# Patient Record
Sex: MALE | Race: WHITE | NOT HISPANIC OR LATINO | Employment: FULL TIME | URBAN - METROPOLITAN AREA
[De-identification: names, ages, dates, MRNs, and addresses within clinical notes are randomized per-mention and may not be internally consistent; named-entity substitution may affect disease eponyms.]

---

## 2023-04-11 ENCOUNTER — TELEPHONE (OUTPATIENT)
Dept: PRIMARY CARE | Facility: CLINIC | Age: 63
End: 2023-04-11
Payer: COMMERCIAL

## 2023-04-11 DIAGNOSIS — I10 HYPERTENSION, UNSPECIFIED TYPE: ICD-10-CM

## 2023-04-11 RX ORDER — AMLODIPINE AND BENAZEPRIL HYDROCHLORIDE 10; 40 MG/1; MG/1
1 CAPSULE ORAL DAILY
COMMUNITY
Start: 2022-01-24 | End: 2023-04-11 | Stop reason: SDUPTHER

## 2023-04-11 RX ORDER — AMLODIPINE AND BENAZEPRIL HYDROCHLORIDE 10; 40 MG/1; MG/1
1 CAPSULE ORAL DAILY
Qty: 90 CAPSULE | Refills: 3 | Status: SHIPPED | OUTPATIENT
Start: 2023-04-11 | End: 2024-04-23 | Stop reason: SDUPTHER

## 2023-04-28 DIAGNOSIS — I10 HYPERTENSION, UNSPECIFIED TYPE: Primary | ICD-10-CM

## 2023-04-28 RX ORDER — CLONIDINE HYDROCHLORIDE 0.2 MG/1
1 TABLET ORAL 2 TIMES DAILY
COMMUNITY
Start: 2022-02-01 | End: 2023-04-28 | Stop reason: SDUPTHER

## 2023-04-28 RX ORDER — CLONIDINE HYDROCHLORIDE 0.2 MG/1
0.2 TABLET ORAL 2 TIMES DAILY
Qty: 180 TABLET | Refills: 3 | Status: SHIPPED | OUTPATIENT
Start: 2023-04-28 | End: 2024-03-01

## 2023-08-14 ENCOUNTER — OFFICE VISIT (OUTPATIENT)
Dept: PRIMARY CARE | Facility: CLINIC | Age: 63
End: 2023-08-14
Payer: COMMERCIAL

## 2023-08-14 VITALS
DIASTOLIC BLOOD PRESSURE: 82 MMHG | BODY MASS INDEX: 37.71 KG/M2 | SYSTOLIC BLOOD PRESSURE: 138 MMHG | WEIGHT: 315 LBS | OXYGEN SATURATION: 94 % | HEART RATE: 71 BPM

## 2023-08-14 DIAGNOSIS — I89.0 LYMPHEDEMA: ICD-10-CM

## 2023-08-14 DIAGNOSIS — Z00.00 PREVENTATIVE HEALTH CARE: Primary | ICD-10-CM

## 2023-08-14 DIAGNOSIS — Z12.5 SCREENING FOR MALIGNANT NEOPLASM OF PROSTATE: ICD-10-CM

## 2023-08-14 DIAGNOSIS — E53.8 LOW SERUM VITAMIN B12: ICD-10-CM

## 2023-08-14 DIAGNOSIS — E11.9 NON-INSULIN DEPENDENT TYPE 2 DIABETES MELLITUS (MULTI): ICD-10-CM

## 2023-08-14 DIAGNOSIS — R79.89 LOW VITAMIN D LEVEL: ICD-10-CM

## 2023-08-14 DIAGNOSIS — E78.2 COMBINED HYPERLIPIDEMIA: ICD-10-CM

## 2023-08-14 DIAGNOSIS — E04.1 THYROID NODULE: ICD-10-CM

## 2023-08-14 PROCEDURE — 3075F SYST BP GE 130 - 139MM HG: CPT | Performed by: FAMILY MEDICINE

## 2023-08-14 PROCEDURE — 1036F TOBACCO NON-USER: CPT | Performed by: FAMILY MEDICINE

## 2023-08-14 PROCEDURE — 99214 OFFICE O/P EST MOD 30 MIN: CPT | Performed by: FAMILY MEDICINE

## 2023-08-14 PROCEDURE — 3008F BODY MASS INDEX DOCD: CPT | Performed by: FAMILY MEDICINE

## 2023-08-14 PROCEDURE — 3079F DIAST BP 80-89 MM HG: CPT | Performed by: FAMILY MEDICINE

## 2023-08-14 RX ORDER — BISOPROLOL FUMARATE AND HYDROCHLOROTHIAZIDE 10; 6.25 MG/1; MG/1
1 TABLET ORAL DAILY
COMMUNITY
End: 2024-01-11

## 2023-08-14 RX ORDER — SPIRONOLACTONE 50 MG/1
1 TABLET, FILM COATED ORAL DAILY
COMMUNITY
Start: 2022-02-11 | End: 2024-02-19 | Stop reason: SDUPTHER

## 2023-08-14 RX ORDER — ATORVASTATIN CALCIUM 80 MG/1
80 TABLET, FILM COATED ORAL DAILY
Qty: 90 TABLET | Refills: 3 | Status: SHIPPED | OUTPATIENT
Start: 2023-08-14 | End: 2024-08-13

## 2023-08-14 RX ORDER — ATORVASTATIN CALCIUM 80 MG/1
1 TABLET, FILM COATED ORAL DAILY
COMMUNITY
Start: 2022-02-01 | End: 2023-08-14 | Stop reason: SDUPTHER

## 2023-08-14 RX ORDER — ASPIRIN 81 MG/1
1 TABLET ORAL DAILY
COMMUNITY

## 2023-08-14 RX ORDER — ORAL SEMAGLUTIDE 14 MG/1
TABLET ORAL
COMMUNITY
End: 2024-01-11

## 2023-08-14 RX ORDER — ERGOCALCIFEROL 1.25 MG/1
50000 CAPSULE ORAL
COMMUNITY
End: 2024-01-11

## 2023-08-14 ASSESSMENT — PATIENT HEALTH QUESTIONNAIRE - PHQ9
SUM OF ALL RESPONSES TO PHQ9 QUESTIONS 1 AND 2: 0
2. FEELING DOWN, DEPRESSED OR HOPELESS: NOT AT ALL
1. LITTLE INTEREST OR PLEASURE IN DOING THINGS: NOT AT ALL

## 2023-08-14 NOTE — PROGRESS NOTES
Subjective   Jus Martinez is a 63 y.o. male who presents for Follow-up (6 month follow up).    HPI  penicillin allergy   from [place]   reports he is feeling well      penicillin allergy   from [place]   reports he is feeling well      #) HTN   - was recently started on meds: amlodipine - benazepril 10-40, bisoprolol-HCTZ 10-6.25 , clonidine 0.2 BID, spironolactone 50 mg   - BP is 168/93 --> 134/80 --> 132/84  - saw Dr cardenas - follow up 2/11/22  - EKG 2/1/22 looked good   - ordered renal US and labs done  - BP is still high today   - runs in the family   - no headaches      #) T2DM   - Dx a couple of years ago   - on metformin 1000 mg twice per day , will add rybelsus   - no checking sugars much at home   - A1c was done on 11/9/2021 at 7.7 --> 7.2% on 8/8/22 --> 7.1% on 2/13/23   - on ACE and Statin   micro albumin 7/26/21  Lipid panel looked good on 11/9/21      ROS was completed and all systems are negative with the exception of what was noted in the the HPI.     Objective     /82   Pulse 71   Wt 144 kg (318 lb)   SpO2 94%   BMI 37.71 kg/m²      Physical Exam  GEN: A+O, no acute distress  HEENT: NC/AT, Oropharynx clear, no exudates, TM visualized, Extraoccular muscles intact, no facial droop; no thyromegaly or cervical LAD  RESP: CTAB, no wheezes   CV: RRR, no murmurs  ABD: soft, non-tender, + BS  SKIN: no rashes or bruising, no peripheral edema   NEURO: CN II-XII grossly intact, moves all extremities equally, no tremor   PSYCH: normal affect, appropriate mood     Assessment/Plan   Problem List Items Addressed This Visit       Low serum vitamin B12    Relevant Orders    Vitamin B12    Low vitamin D level    Relevant Orders    Vitamin D 25-Hydroxy,Total     Other Visit Diagnoses       Preventative health care    -  Primary    Relevant Orders    CBC and Auto Differential    Comprehensive metabolic panel    Hemoglobin A1C    Tsh With Reflex To Free T4 If Abnormal    Screening for malignant  neoplasm of prostate        Relevant Orders    Prostate Spec.Ag,Screen    Combined hyperlipidemia        Relevant Medications    atorvastatin (Lipitor) 80 mg tablet    Other Relevant Orders    Lipid panel    Thyroid nodule        Relevant Orders    US thyroid    Non-insulin dependent type 2 diabetes mellitus (CMS/HCC)               Labs do again in August 2023 , order given today      Continue the rybelsus 14 mg daily.      Please continue a weekly vitamin D supplement      Please continue an over the counter B12 supplement.     please check with your local pharmacist for the shingles vaccination     Please get a thyroid ultrasound repeated in September 2023, please get prior to next visit.      Continue to use the sleep medicine      Monitor you blood pressure at home , goal is to get this below 130/80.     Follow up in 6 months for your yearly physical or sooner as needed.            Brittanie Childress DO, [facility], [facility]  [address]   [address]   [place] [address]  Ph. [phone]  Fx. [phone]

## 2023-08-14 NOTE — PATIENT INSTRUCTIONS
Labs do again in August 2023 , order given today      Continue the rybelsus 14 mg daily.      Please continue a weekly vitamin D supplement      Please continue an over the counter B12 supplement.     please check with your local pharmacist for the shingles vaccination     Please get a thyroid ultrasound repeated in September 2023, please get prior to next visit.      Continue to use the sleep medicine      Monitor you blood pressure at home , goal is to get this below 130/80.     Follow up in 6 months for your yearly physical or sooner as needed.

## 2024-01-10 DIAGNOSIS — I10 ESSENTIAL (PRIMARY) HYPERTENSION: ICD-10-CM

## 2024-01-10 DIAGNOSIS — R79.89 OTHER SPECIFIED ABNORMAL FINDINGS OF BLOOD CHEMISTRY: ICD-10-CM

## 2024-01-10 DIAGNOSIS — E11.9 TYPE 2 DIABETES MELLITUS WITHOUT COMPLICATIONS (MULTI): ICD-10-CM

## 2024-01-11 RX ORDER — BISOPROLOL FUMARATE AND HYDROCHLOROTHIAZIDE 10; 6.25 MG/1; MG/1
1 TABLET ORAL DAILY
Qty: 90 TABLET | Refills: 3 | Status: SHIPPED | OUTPATIENT
Start: 2024-01-11

## 2024-01-11 RX ORDER — ERGOCALCIFEROL 1.25 MG/1
1 CAPSULE ORAL
Qty: 12 CAPSULE | Refills: 3 | Status: SHIPPED | OUTPATIENT
Start: 2024-01-11

## 2024-01-11 RX ORDER — ORAL SEMAGLUTIDE 14 MG/1
TABLET ORAL
Qty: 90 TABLET | Refills: 3 | Status: SHIPPED | OUTPATIENT
Start: 2024-01-11

## 2024-02-09 ENCOUNTER — LAB (OUTPATIENT)
Dept: LAB | Facility: LAB | Age: 64
End: 2024-02-09
Payer: COMMERCIAL

## 2024-02-09 DIAGNOSIS — E78.2 COMBINED HYPERLIPIDEMIA: ICD-10-CM

## 2024-02-09 DIAGNOSIS — R79.89 LOW VITAMIN D LEVEL: ICD-10-CM

## 2024-02-09 DIAGNOSIS — Z12.5 SCREENING FOR MALIGNANT NEOPLASM OF PROSTATE: ICD-10-CM

## 2024-02-09 DIAGNOSIS — E53.8 LOW SERUM VITAMIN B12: ICD-10-CM

## 2024-02-09 DIAGNOSIS — Z00.00 PREVENTATIVE HEALTH CARE: ICD-10-CM

## 2024-02-09 LAB
ALBUMIN SERPL BCP-MCNC: 3.9 G/DL (ref 3.4–5)
ALP SERPL-CCNC: 54 U/L (ref 33–136)
ALT SERPL W P-5'-P-CCNC: 15 U/L (ref 10–52)
ANION GAP SERPL CALC-SCNC: 11 MMOL/L (ref 10–20)
AST SERPL W P-5'-P-CCNC: 15 U/L (ref 9–39)
BASOPHILS # BLD AUTO: 0.07 X10*3/UL (ref 0–0.1)
BASOPHILS NFR BLD AUTO: 0.7 %
BILIRUB SERPL-MCNC: 0.7 MG/DL (ref 0–1.2)
BUN SERPL-MCNC: 22 MG/DL (ref 6–23)
CALCIUM SERPL-MCNC: 9.1 MG/DL (ref 8.6–10.3)
CHLORIDE SERPL-SCNC: 101 MMOL/L (ref 98–107)
CHOLEST SERPL-MCNC: 111 MG/DL (ref 0–199)
CHOLESTEROL/HDL RATIO: 3.7
CO2 SERPL-SCNC: 27 MMOL/L (ref 21–32)
CREAT SERPL-MCNC: 1.16 MG/DL (ref 0.5–1.3)
EGFRCR SERPLBLD CKD-EPI 2021: 71 ML/MIN/1.73M*2
EOSINOPHIL # BLD AUTO: 0.43 X10*3/UL (ref 0–0.7)
EOSINOPHIL NFR BLD AUTO: 4.5 %
ERYTHROCYTE [DISTWIDTH] IN BLOOD BY AUTOMATED COUNT: 12.8 % (ref 11.5–14.5)
GLUCOSE SERPL-MCNC: 122 MG/DL (ref 74–99)
HCT VFR BLD AUTO: 44.2 % (ref 41–52)
HDLC SERPL-MCNC: 29.7 MG/DL
HGB BLD-MCNC: 14.6 G/DL (ref 13.5–17.5)
IMM GRANULOCYTES # BLD AUTO: 0.02 X10*3/UL (ref 0–0.7)
IMM GRANULOCYTES NFR BLD AUTO: 0.2 % (ref 0–0.9)
LDLC SERPL CALC-MCNC: 62 MG/DL
LYMPHOCYTES # BLD AUTO: 1.83 X10*3/UL (ref 1.2–4.8)
LYMPHOCYTES NFR BLD AUTO: 19.1 %
MCH RBC QN AUTO: 30 PG (ref 26–34)
MCHC RBC AUTO-ENTMCNC: 33 G/DL (ref 32–36)
MCV RBC AUTO: 91 FL (ref 80–100)
MONOCYTES # BLD AUTO: 0.56 X10*3/UL (ref 0.1–1)
MONOCYTES NFR BLD AUTO: 5.8 %
NEUTROPHILS # BLD AUTO: 6.67 X10*3/UL (ref 1.2–7.7)
NEUTROPHILS NFR BLD AUTO: 69.7 %
NON HDL CHOLESTEROL: 81 MG/DL (ref 0–149)
NRBC BLD-RTO: 0 /100 WBCS (ref 0–0)
PLATELET # BLD AUTO: 125 X10*3/UL (ref 150–450)
POTASSIUM SERPL-SCNC: 4.2 MMOL/L (ref 3.5–5.3)
PROT SERPL-MCNC: 6.9 G/DL (ref 6.4–8.2)
RBC # BLD AUTO: 4.87 X10*6/UL (ref 4.5–5.9)
SODIUM SERPL-SCNC: 135 MMOL/L (ref 136–145)
TRIGL SERPL-MCNC: 97 MG/DL (ref 0–149)
TSH SERPL-ACNC: 2.51 MIU/L (ref 0.44–3.98)
VLDL: 19 MG/DL (ref 0–40)
WBC # BLD AUTO: 9.6 X10*3/UL (ref 4.4–11.3)

## 2024-02-09 PROCEDURE — 36415 COLL VENOUS BLD VENIPUNCTURE: CPT

## 2024-02-09 PROCEDURE — 82607 VITAMIN B-12: CPT

## 2024-02-09 PROCEDURE — 80053 COMPREHEN METABOLIC PANEL: CPT

## 2024-02-09 PROCEDURE — 80061 LIPID PANEL: CPT

## 2024-02-09 PROCEDURE — 84443 ASSAY THYROID STIM HORMONE: CPT

## 2024-02-09 PROCEDURE — 85025 COMPLETE CBC W/AUTO DIFF WBC: CPT

## 2024-02-09 PROCEDURE — 82306 VITAMIN D 25 HYDROXY: CPT

## 2024-02-09 PROCEDURE — 84153 ASSAY OF PSA TOTAL: CPT

## 2024-02-09 PROCEDURE — 83036 HEMOGLOBIN GLYCOSYLATED A1C: CPT

## 2024-02-10 LAB
25(OH)D3 SERPL-MCNC: 91 NG/ML (ref 30–100)
EST. AVERAGE GLUCOSE BLD GHB EST-MCNC: 174 MG/DL
HBA1C MFR BLD: 7.7 %
PSA SERPL-MCNC: 0.41 NG/ML
VIT B12 SERPL-MCNC: 375 PG/ML (ref 211–911)

## 2024-02-19 ENCOUNTER — OFFICE VISIT (OUTPATIENT)
Dept: PRIMARY CARE | Facility: CLINIC | Age: 64
End: 2024-02-19
Payer: COMMERCIAL

## 2024-02-19 VITALS
WEIGHT: 313 LBS | DIASTOLIC BLOOD PRESSURE: 78 MMHG | HEART RATE: 67 BPM | SYSTOLIC BLOOD PRESSURE: 124 MMHG | OXYGEN SATURATION: 97 % | BODY MASS INDEX: 37.12 KG/M2

## 2024-02-19 DIAGNOSIS — E11.9 NON-INSULIN DEPENDENT TYPE 2 DIABETES MELLITUS (MULTI): ICD-10-CM

## 2024-02-19 DIAGNOSIS — I10 HYPERTENSION, UNSPECIFIED TYPE: ICD-10-CM

## 2024-02-19 DIAGNOSIS — E04.1 THYROID NODULE: ICD-10-CM

## 2024-02-19 DIAGNOSIS — Z00.00 PREVENTATIVE HEALTH CARE: Primary | ICD-10-CM

## 2024-02-19 PROCEDURE — 1036F TOBACCO NON-USER: CPT | Performed by: FAMILY MEDICINE

## 2024-02-19 PROCEDURE — 3051F HG A1C>EQUAL 7.0%<8.0%: CPT | Performed by: FAMILY MEDICINE

## 2024-02-19 PROCEDURE — 99396 PREV VISIT EST AGE 40-64: CPT | Performed by: FAMILY MEDICINE

## 2024-02-19 PROCEDURE — 3048F LDL-C <100 MG/DL: CPT | Performed by: FAMILY MEDICINE

## 2024-02-19 PROCEDURE — 3078F DIAST BP <80 MM HG: CPT | Performed by: FAMILY MEDICINE

## 2024-02-19 PROCEDURE — 3008F BODY MASS INDEX DOCD: CPT | Performed by: FAMILY MEDICINE

## 2024-02-19 PROCEDURE — 99214 OFFICE O/P EST MOD 30 MIN: CPT | Performed by: FAMILY MEDICINE

## 2024-02-19 PROCEDURE — 3074F SYST BP LT 130 MM HG: CPT | Performed by: FAMILY MEDICINE

## 2024-02-19 RX ORDER — DAPAGLIFLOZIN 10 MG/1
10 TABLET, FILM COATED ORAL DAILY
Qty: 90 TABLET | Refills: 3 | Status: SHIPPED | OUTPATIENT
Start: 2024-02-19 | End: 2024-02-26 | Stop reason: CLARIF

## 2024-02-19 RX ORDER — SPIRONOLACTONE 50 MG/1
50 TABLET, FILM COATED ORAL DAILY
Qty: 90 TABLET | Refills: 3 | Status: SHIPPED | OUTPATIENT
Start: 2024-02-19 | End: 2025-02-18

## 2024-02-19 ASSESSMENT — PATIENT HEALTH QUESTIONNAIRE - PHQ9
1. LITTLE INTEREST OR PLEASURE IN DOING THINGS: NOT AT ALL
SUM OF ALL RESPONSES TO PHQ9 QUESTIONS 1 AND 2: 0
2. FEELING DOWN, DEPRESSED OR HOPELESS: NOT AT ALL

## 2024-02-19 NOTE — PROGRESS NOTES
Subjective   Jus Martinez is a 64 y.o. male who presents for Annual Exam (Physical).    HPI  penicillin allergy   from [place]   reports he is feeling well      penicillin allergy   from [place]   reports he is feeling well   - labs done on 2/9/24- We might need to add another pill, such a farxiga or Jardiance.  Other than elevated sugar the electrolytes look good, lvier and kidney function is good. Platelet are slightly low, which was also low last year. Normal red and white blood cell counts, no evidence of anemia or infection. Vitamin D is good at 91, you can reduce dose by half. Vitamin B 12 is good. Normal prostate level. Cholesterol looks ok on the atorvastatin 80 mg daily. Normal thyroid levels.      #) HTN   - was recently started on meds: amlodipine - benazepril 10-40, bisoprolol-HCTZ 10-6.25 , clonidine 0.2 BID, spironolactone 50 mg   - BP is 168/93 --> 134/80 --> 132/84--> 124/78   - saw Dr cardenas in the past, not currently   - EKG 2/1/22 looked good   - ordered renal US and labs done- normal   - runs in the family   - no headaches , chest pain , dyspnea, change in vision  - FIB-4- 1.92      #) T2DM   - Dx a couple of years ago   - on metformin 1000 mg twice per day , will add rybelsus   - no checking sugars much at home   - A1c was done on 11/9/2021 at 7.7 --> 7.2% on 8/8/22 --> 7.1% on 2/13/23 --> 7/7% on 2/9/24  - on ACE and Statin   micro albumin 7/26/21  Lipid panel looked good on 11/9/21     #) Thyroid nodule- new order for US follow up      ROS was completed and all systems are negative with the exception of what was noted in the the HPI.     Objective     /78   Pulse 67   Wt 142 kg (313 lb)   SpO2 97%   BMI 37.12 kg/m²      Physical Exam  GEN: A+O, no acute distress  HEENT: NC/AT, Oropharynx clear, no exudates, TM visualized, Extraoccular muscles intact, no facial droop; no thyromegaly or cervical LAD  RESP: CTAB, no wheezes   CV: RRR, no murmurs  ABD: soft, non-tender, +  BS  SKIN: no rashes or bruising, no peripheral edema   NEURO: CN II-XII grossly intact, moves all extremities equally, no tremor   PSYCH: normal affect, appropriate mood     Assessment/Plan   Problem List Items Addressed This Visit    None        We reviewed your labs on 2/9/24- We might need to add another pill, such a farxiga or Jardiance.  Other than elevated sugar the electrolytes look good, lvier and kidney function is good. Platelet are slightly low, which was also low last year. Normal red and white blood cell counts, no evidence of anemia or infection. Vitamin D is good at 91, you can reduce dose by half. Vitamin B 12 is good. Normal prostate level. Cholesterol looks ok on the atorvastatin 80 mg daily. Normal thyroid levels.      Continue the rybelsus 14 mg daily.  We might want to add farxiga for better sugar control.     You are down 5# since last visit.   BMI is 37, goal is to get this under 30.   please continue to work on health diet and movement.     Blood pressure looks good at 124/78.     Please continue a weekly vitamin D supplement      Please continue an over the counter B12 supplement.     please check with your local pharmacist for the shingles vaccination     Please get a thyroid ultrasound repeated in September 2023, please get prior to next visit. New order placed      Continue to use the sleep medicine      Monitor you blood pressure at home , goal is to get this below 130/80.     Follow up in 6 months for a  follow up or sooner as needed.         Brittanie Childress DO, [facility], [facility]  [address]   [address]   [place] [address]  Ph. [phone]  Fx. [phone]

## 2024-02-19 NOTE — PATIENT INSTRUCTIONS
We reviewed your labs on 2/9/24- We might need to add another pill, such a farxiga or Jardiance.  Other than elevated sugar the electrolytes look good, lvier and kidney function is good. Platelet are slightly low, which was also low last year. Normal red and white blood cell counts, no evidence of anemia or infection. Vitamin D is good at 91, you can reduce dose by half. Vitamin B 12 is good. Normal prostate level. Cholesterol looks ok on the atorvastatin 80 mg daily. Normal thyroid levels.      Continue the rybelsus 14 mg daily.  We might want to add farxiga for better sugar control.     You are down 5# since last visit.   BMI is 37, goal is to get this under 30.   please continue to work on health diet and movement.     Blood pressure looks good at 124/78.     Please continue a weekly vitamin D supplement      Please continue an over the counter B12 supplement.     please check with your local pharmacist for the shingles vaccination     Please get a thyroid ultrasound repeated in September 2023, please get prior to next visit. New order placed      Continue to use the sleep medicine      Monitor you blood pressure at home , goal is to get this below 130/80.     Follow up in 6 months for a  follow up or sooner as needed.

## 2024-02-26 DIAGNOSIS — E11.9 NON-INSULIN DEPENDENT TYPE 2 DIABETES MELLITUS (MULTI): Primary | ICD-10-CM

## 2024-02-29 DIAGNOSIS — I10 HYPERTENSION, UNSPECIFIED TYPE: ICD-10-CM

## 2024-03-01 RX ORDER — CLONIDINE HYDROCHLORIDE 0.2 MG/1
0.2 TABLET ORAL 2 TIMES DAILY
Qty: 180 TABLET | Refills: 3 | Status: SHIPPED | OUTPATIENT
Start: 2024-03-01

## 2024-04-23 DIAGNOSIS — I10 HYPERTENSION, UNSPECIFIED TYPE: ICD-10-CM

## 2024-04-23 RX ORDER — AMLODIPINE AND BENAZEPRIL HYDROCHLORIDE 10; 40 MG/1; MG/1
1 CAPSULE ORAL DAILY
Qty: 90 CAPSULE | Refills: 3 | Status: SHIPPED | OUTPATIENT
Start: 2024-04-23

## 2024-05-07 NOTE — PROGRESS NOTES
"[facility]  Followup Visit Note    HISTORY OF PRESENT ILLNESS   Jus Martinez \"Dio\" is a 64 y.o. male who presents to a [facility] for followup.       Current History    On today's visit, the patient reports he is doing well with CPAP. He feels he sleeps better and is less tired with CPAP.    Using FFM and finds it comfortable. No pressure or humidity problems.                        PREVIOUS VISIT 04/26/2023  OBSTRUCTIVE SLEEP APNEA  -Download shows 100% use > 4 hours per night with average use 7 hours 42 minutes per night. Pressure 5-15 cm H2O. 95% pressure 13.7 cm H2O. 95% Leak is 8 L/min. AHI 0.4.  -Ordering supplies from [facility]  -Tolerating FFM mask, pressure, humidity, machine well  -Reviewed risks of untreated sleep apnea including HTN and increased CVD risk  -Goal of CPAP is less daytime sleepiness and he feels CPAP is helping     OBESITY  -BMI 38 today  -Recommend healthy diet and exercise to help maintain healthy weight     HYPERTENSION  -/85 and 152/90 today     Followup in 1 year for ASHA/CPAP mgmt      Chief Complaint  Chief Complaints    · Visit For: Other     FUV ASHA on CPAP DME is [facility].     Sleep Scales:  ESS: 8     REVIEW OF SYSTEMS    All other systems negative      PHYSICAL EXAM     VITAL SIGNS: /81   Pulse 65   Ht 1.956 m (6' 5\")   Wt 134 kg (296 lb)   SpO2 95%   BMI 35.10 kg/m²      CURRENT WEIGHT: [unfilled]  BMI: [unfilled]  PREVIOUS WEIGHTS:  Wt Readings from Last 3 Encounters:   05/08/24 134 kg (296 lb)   02/19/24 142 kg (313 lb)   08/14/23 144 kg (318 lb)       Constitutional: Alert and oriented, cooperative, no obvious distress   HEENT: Non icteric or anemic, EOM WNL bilaterally   Neck: Supple, no JVD, no goiter, no adenopathy, no rigidity  Extremities: No clubbing, no LL edema   Neuromuscular: Cranial nerves grossly intact, no focal deficits     RESULTS/DATA     No results found for: \"IRON\", \"TRANSFERRIN\", " "\"IRONSAT\", \"TIBC\", \"FERRITIN\"      ASSESSMENT/PLAN     Mr. Martinez is a 64 y.o. male and returns in followup for the following issues:    OBSTRUCTIVE SLEEP APNEA  -Benefiting from CPAP  -Good compliance, ASHA well controlled per download   -Tolerating mask, pressure, humidity well    BMI>35  -Body mass index is 35.1 kg/m².  today  -With sufficient weight loss may no longer require treatment for ASHA    HYPERTENSION  BP Readings from Last 3 Encounters:   05/08/24 124/81   02/19/24 124/78   08/14/23 138/82      -Overall controlled with current medications     Follow up 1 yr         "

## 2024-05-08 ENCOUNTER — OFFICE VISIT (OUTPATIENT)
Dept: SLEEP MEDICINE | Facility: CLINIC | Age: 64
End: 2024-05-08
Payer: COMMERCIAL

## 2024-05-08 VITALS
DIASTOLIC BLOOD PRESSURE: 81 MMHG | HEART RATE: 65 BPM | HEIGHT: 77 IN | SYSTOLIC BLOOD PRESSURE: 124 MMHG | WEIGHT: 296 LBS | BODY MASS INDEX: 34.95 KG/M2 | OXYGEN SATURATION: 95 %

## 2024-05-08 DIAGNOSIS — G47.33 OSA (OBSTRUCTIVE SLEEP APNEA): Primary | ICD-10-CM

## 2024-05-08 DIAGNOSIS — I10 ESSENTIAL HYPERTENSION: ICD-10-CM

## 2024-05-08 PROCEDURE — 3074F SYST BP LT 130 MM HG: CPT | Performed by: PHYSICIAN ASSISTANT

## 2024-05-08 PROCEDURE — 3079F DIAST BP 80-89 MM HG: CPT | Performed by: PHYSICIAN ASSISTANT

## 2024-05-08 PROCEDURE — 1036F TOBACCO NON-USER: CPT | Performed by: PHYSICIAN ASSISTANT

## 2024-05-08 PROCEDURE — 99214 OFFICE O/P EST MOD 30 MIN: CPT | Performed by: PHYSICIAN ASSISTANT

## 2024-05-08 PROCEDURE — 3008F BODY MASS INDEX DOCD: CPT | Performed by: PHYSICIAN ASSISTANT

## 2024-05-08 ASSESSMENT — SLEEP AND FATIGUE QUESTIONNAIRES
ESS-CHAD TOTAL SCORE: 8
HOW LIKELY ARE YOU TO NOD OFF OR FALL ASLEEP WHEN YOU ARE A PASSENGER IN A CAR FOR AN HOUR WITHOUT A BREAK: WOULD NEVER DOZE
HOW LIKELY ARE YOU TO NOD OFF OR FALL ASLEEP WHILE WATCHING TV: MODERATE CHANCE OF DOZING
HOW LIKELY ARE YOU TO NOD OFF OR FALL ASLEEP WHILE SITTING AND READING: SLIGHT CHANCE OF DOZING
HOW LIKELY ARE YOU TO NOD OFF OR FALL ASLEEP IN A CAR, WHILE STOPPED FOR A FEW MINUTES IN TRAFFIC: WOULD NEVER DOZE
SITING INACTIVE IN A PUBLIC PLACE LIKE A CLASS ROOM OR A MOVIE THEATER: SLIGHT CHANCE OF DOZING
HOW LIKELY ARE YOU TO NOD OFF OR FALL ASLEEP WHILE SITTING AND TALKING TO SOMEONE: WOULD NEVER DOZE
HOW LIKELY ARE YOU TO NOD OFF OR FALL ASLEEP WHILE LYING DOWN TO REST IN THE AFTERNOON WHEN CIRCUMSTANCES PERMIT: HIGH CHANCE OF DOZING
HOW LIKELY ARE YOU TO NOD OFF OR FALL ASLEEP WHILE SITTING QUIETLY AFTER LUNCH WITHOUT ALCOHOL: SLIGHT CHANCE OF DOZING

## 2024-05-08 ASSESSMENT — PATIENT HEALTH QUESTIONNAIRE - PHQ9
2. FEELING DOWN, DEPRESSED OR HOPELESS: NOT AT ALL
1. LITTLE INTEREST OR PLEASURE IN DOING THINGS: NOT AT ALL
SUM OF ALL RESPONSES TO PHQ9 QUESTIONS 1 & 2: 0

## 2024-05-08 ASSESSMENT — LIFESTYLE VARIABLES
HOW MANY STANDARD DRINKS CONTAINING ALCOHOL DO YOU HAVE ON A TYPICAL DAY: 1 OR 2
SKIP TO QUESTIONS 9-10: 1
AUDIT-C TOTAL SCORE: 2
HOW OFTEN DO YOU HAVE A DRINK CONTAINING ALCOHOL: 2-4 TIMES A MONTH
HOW OFTEN DO YOU HAVE SIX OR MORE DRINKS ON ONE OCCASION: NEVER

## 2024-05-08 ASSESSMENT — PAIN SCALES - GENERAL: PAINLEVEL: 0-NO PAIN

## 2024-05-08 NOTE — PATIENT INSTRUCTIONS
Great seeing you today,    CPAP data looks very good. We will order supplies for your CPAP.    Let us know if you have any issues with pressure, humidity, or mask.    We will plan on seeing you back in 1 year    Franky Perez PA-C    IMPORTANT PHONE NUMBERS     [phone] ([id number])  [facility] (DME): [phone]  For clinical questions and refilling prescriptions: [phone]  Gabby Archer (For Yissel/Ana): P: [phone]

## 2024-05-18 ENCOUNTER — HOSPITAL ENCOUNTER (OUTPATIENT)
Dept: RADIOLOGY | Facility: HOSPITAL | Age: 64
Discharge: HOME | End: 2024-05-18
Payer: COMMERCIAL

## 2024-05-18 DIAGNOSIS — E04.1 THYROID NODULE: ICD-10-CM

## 2024-05-18 PROCEDURE — 76536 US EXAM OF HEAD AND NECK: CPT | Performed by: RADIOLOGY

## 2024-05-18 PROCEDURE — 76536 US EXAM OF HEAD AND NECK: CPT

## 2024-06-12 DIAGNOSIS — E78.2 COMBINED HYPERLIPIDEMIA: ICD-10-CM

## 2024-06-14 RX ORDER — ATORVASTATIN CALCIUM 80 MG/1
80 TABLET, FILM COATED ORAL DAILY
Qty: 90 TABLET | Refills: 3 | Status: SHIPPED | OUTPATIENT
Start: 2024-06-14

## 2024-08-19 ENCOUNTER — APPOINTMENT (OUTPATIENT)
Dept: PRIMARY CARE | Facility: CLINIC | Age: 64
End: 2024-08-19
Payer: COMMERCIAL

## 2024-08-19 VITALS
WEIGHT: 290 LBS | HEART RATE: 60 BPM | BODY MASS INDEX: 34.24 KG/M2 | OXYGEN SATURATION: 95 % | HEIGHT: 77 IN | SYSTOLIC BLOOD PRESSURE: 117 MMHG | DIASTOLIC BLOOD PRESSURE: 74 MMHG

## 2024-08-19 DIAGNOSIS — E11.9 NON-INSULIN DEPENDENT TYPE 2 DIABETES MELLITUS (MULTI): Primary | ICD-10-CM

## 2024-08-19 LAB — POC HEMOGLOBIN A1C: 6.7 % (ref 4.2–6.5)

## 2024-08-19 PROCEDURE — 99214 OFFICE O/P EST MOD 30 MIN: CPT | Performed by: FAMILY MEDICINE

## 2024-08-19 PROCEDURE — 3008F BODY MASS INDEX DOCD: CPT | Performed by: FAMILY MEDICINE

## 2024-08-19 PROCEDURE — 83036 HEMOGLOBIN GLYCOSYLATED A1C: CPT | Performed by: FAMILY MEDICINE

## 2024-08-19 PROCEDURE — 3078F DIAST BP <80 MM HG: CPT | Performed by: FAMILY MEDICINE

## 2024-08-19 PROCEDURE — 3074F SYST BP LT 130 MM HG: CPT | Performed by: FAMILY MEDICINE

## 2024-08-19 PROCEDURE — 1036F TOBACCO NON-USER: CPT | Performed by: FAMILY MEDICINE

## 2024-08-19 PROCEDURE — 3048F LDL-C <100 MG/DL: CPT | Performed by: FAMILY MEDICINE

## 2024-08-19 PROCEDURE — 3051F HG A1C>EQUAL 7.0%<8.0%: CPT | Performed by: FAMILY MEDICINE

## 2024-08-19 NOTE — PATIENT INSTRUCTIONS
Continue the rybelsus 14 mg daily. A1c looks great today at 6.7%   You are down 13# in the past 6 months     BMI is 37--> 34, goal is to get this under 30.   please continue to work on health diet and movement.     Blood pressure looks good at 117/74.  Which is great     Work on good sleep and stress managment    Please continue a weekly vitamin D supplement      Please continue an over the counter B12 supplement.     please check with your local pharmacist for the shingles vaccination     Please get a thyroid ultrasound on 5/18/24- shows a benign nodule and the previous one seen on the left side is not seen.      Continue to use the sleep medicine     Follow up in 6 months for your yearly physical and follow up or sooner as needed.

## 2024-08-19 NOTE — PROGRESS NOTES
"Subjective   Dio Martinez is a 64 y.o. male who presents for Follow-up (6 month follow up).    HPI  penicillin allergy   from [place]   reports he is feeling well      penicillin allergy   from [place]   reports he is feeling well   - labs done on 2/9/24- We might need to add another pill, such a farxiga or Jardiance.  Other than elevated sugar the electrolytes look good, lvier and kidney function is good. Platelet are slightly low, which was also low last year. Normal red and white blood cell counts, no evidence of anemia or infection. Vitamin D is good at 91, you can reduce dose by half. Vitamin B 12 is good. Normal prostate level. Cholesterol looks ok on the atorvastatin 80 mg daily. Normal thyroid levels.     #) Pos cologuard 5/7/19- 2 colonscopy since then   - Dr Mayers     #) HTN   - was recently started on meds: amlodipine - benazepril 10-40, bisoprolol-HCTZ 10-6.25 , clonidine 0.2 BID, spironolactone 50 mg   - BP is 168/93 --> 134/80 --> 132/84--> 124/78 --> 117/74   - saw Dr cardenas in the past, not currently   - EKG 2/1/22 looked good   - ordered renal US and labs done- normal   - runs in the family   - no headaches , chest pain , dyspnea, change in vision  - FIB-4- 1.92      #) T2DM   - Dx a couple of years ago   - on metformin 1000 mg twice per day , added rybelsus 14 mg daily   - no checking sugars much at home   - home readings are around 109, random checks at home   - no low sugars  - A1c was done on 11/9/2021 at 7.7 --> 7.2% on 8/8/22 --> 7.1% on 2/13/23 --> 7.7% on 2/9/24 --> 6.7% today 8/19/24   - down 13# in the last 6 months  - on ACE and Statin   micro albumin 7/26/21  Lipid panel looked good on 11/9/21     #) Thyroid nodule- new order for US follow up      ROS was completed and all systems are negative with the exception of what was noted in the the HPI.     Objective     /74   Pulse 60   Ht 1.956 m (6' 5\")   Wt 132 kg (290 lb)   SpO2 95%   BMI 34.39 kg/m²      Physical " Exam  GEN: A+O, no acute distress  HEENT: NC/AT, Oropharynx clear, no exudates, TM visualized, Extraoccular muscles intact, no facial droop; no thyromegaly or cervical LAD  RESP: CTAB, no wheezes   CV: RRR, no murmurs  ABD: soft, non-tender, + BS  SKIN: no rashes or bruising, no peripheral edema   NEURO: CN II-XII grossly intact, moves all extremities equally, no tremor   PSYCH: normal affect, appropriate mood     Assessment/Plan   Problem List Items Addressed This Visit    None       Continue the rybelsus 14 mg daily. A1c looks great today at 6.7%   You are down 13# in the past 6 months     BMI is 37--> 34, goal is to get this under 30.   please continue to work on health diet and movement.     Blood pressure looks good at 117/74.  Which is great     Work on good sleep and stress managment    Please continue a weekly vitamin D supplement      Please continue an over the counter B12 supplement.     please check with your local pharmacist for the shingles vaccination     Please get a thyroid ultrasound on 5/18/24- shows a benign nodule and the previous one seen on the left side is not seen.      Continue to use the sleep medicine     Follow up in 6 months for your yearly physical and follow up or sooner as needed.         Brittanie Childress DO, [facility], [facility]  [address]   [address]   [place] [address]  Ph. [phone]  Fx. [phone]

## 2024-08-20 ENCOUNTER — TELEPHONE (OUTPATIENT)
Dept: PRIMARY CARE | Facility: CLINIC | Age: 64
End: 2024-08-20
Payer: COMMERCIAL

## 2024-11-11 ENCOUNTER — TELEPHONE (OUTPATIENT)
Dept: SLEEP MEDICINE | Facility: CLINIC | Age: 64
End: 2024-11-11

## 2024-11-29 ENCOUNTER — TELEPHONE (OUTPATIENT)
Dept: SLEEP MEDICINE | Facility: CLINIC | Age: 64
End: 2024-11-29

## 2024-11-29 NOTE — TELEPHONE ENCOUNTER
Chandrika, 11/29@2:54p to let him know the location for his 5/14 appt. With MATTIE Perez has been moved to [facility]

## 2024-12-23 DIAGNOSIS — I10 HYPERTENSION, UNSPECIFIED TYPE: ICD-10-CM

## 2024-12-27 RX ORDER — SPIRONOLACTONE 50 MG/1
50 TABLET, FILM COATED ORAL DAILY
Qty: 90 TABLET | Refills: 3 | Status: SHIPPED | OUTPATIENT
Start: 2024-12-27

## 2025-01-12 DIAGNOSIS — I10 ESSENTIAL (PRIMARY) HYPERTENSION: ICD-10-CM

## 2025-01-12 DIAGNOSIS — E11.9 TYPE 2 DIABETES MELLITUS WITHOUT COMPLICATIONS (MULTI): ICD-10-CM

## 2025-01-12 DIAGNOSIS — E11.9 NON-INSULIN DEPENDENT TYPE 2 DIABETES MELLITUS (MULTI): ICD-10-CM

## 2025-01-12 DIAGNOSIS — R79.89 OTHER SPECIFIED ABNORMAL FINDINGS OF BLOOD CHEMISTRY: ICD-10-CM

## 2025-01-13 RX ORDER — ERGOCALCIFEROL 1.25 MG/1
1 CAPSULE ORAL
Qty: 12 CAPSULE | Refills: 3 | Status: SHIPPED | OUTPATIENT
Start: 2025-01-19

## 2025-01-13 RX ORDER — EMPAGLIFLOZIN 25 MG/1
25 TABLET, FILM COATED ORAL DAILY
Qty: 90 TABLET | Refills: 3 | Status: SHIPPED | OUTPATIENT
Start: 2025-01-13

## 2025-01-13 RX ORDER — BISOPROLOL FUMARATE AND HYDROCHLOROTHIAZIDE 10; 6.25 MG/1; MG/1
1 TABLET ORAL DAILY
Qty: 90 TABLET | Refills: 3 | Status: SHIPPED | OUTPATIENT
Start: 2025-01-13

## 2025-01-13 RX ORDER — ORAL SEMAGLUTIDE 14 MG/1
TABLET ORAL
Qty: 90 TABLET | Refills: 3 | Status: SHIPPED | OUTPATIENT
Start: 2025-01-13

## 2025-02-03 DIAGNOSIS — I10 HYPERTENSION, UNSPECIFIED TYPE: ICD-10-CM

## 2025-02-05 RX ORDER — CLONIDINE HYDROCHLORIDE 0.2 MG/1
0.2 TABLET ORAL 2 TIMES DAILY
Qty: 180 TABLET | Refills: 3 | Status: SHIPPED | OUTPATIENT
Start: 2025-02-05

## 2025-02-17 ENCOUNTER — APPOINTMENT (OUTPATIENT)
Dept: PRIMARY CARE | Facility: CLINIC | Age: 65
End: 2025-02-17
Payer: COMMERCIAL

## 2025-02-17 VITALS
BODY MASS INDEX: 35.69 KG/M2 | DIASTOLIC BLOOD PRESSURE: 72 MMHG | SYSTOLIC BLOOD PRESSURE: 124 MMHG | HEART RATE: 88 BPM | WEIGHT: 301 LBS | OXYGEN SATURATION: 98 %

## 2025-02-17 DIAGNOSIS — E11.9 NON-INSULIN DEPENDENT TYPE 2 DIABETES MELLITUS (MULTI): ICD-10-CM

## 2025-02-17 DIAGNOSIS — Z00.00 PREVENTATIVE HEALTH CARE: Primary | ICD-10-CM

## 2025-02-17 DIAGNOSIS — R79.89 LOW VITAMIN D LEVEL: ICD-10-CM

## 2025-02-17 DIAGNOSIS — I10 HYPERTENSION, UNSPECIFIED TYPE: ICD-10-CM

## 2025-02-17 DIAGNOSIS — Z12.5 SCREENING FOR MALIGNANT NEOPLASM OF PROSTATE: ICD-10-CM

## 2025-02-17 LAB — POC HEMOGLOBIN A1C: 7.1 % (ref 4.2–6.5)

## 2025-02-17 PROCEDURE — 3074F SYST BP LT 130 MM HG: CPT | Performed by: FAMILY MEDICINE

## 2025-02-17 PROCEDURE — 99213 OFFICE O/P EST LOW 20 MIN: CPT | Performed by: FAMILY MEDICINE

## 2025-02-17 PROCEDURE — 1036F TOBACCO NON-USER: CPT | Performed by: FAMILY MEDICINE

## 2025-02-17 PROCEDURE — 83036 HEMOGLOBIN GLYCOSYLATED A1C: CPT | Performed by: FAMILY MEDICINE

## 2025-02-17 PROCEDURE — 3078F DIAST BP <80 MM HG: CPT | Performed by: FAMILY MEDICINE

## 2025-02-17 PROCEDURE — 99396 PREV VISIT EST AGE 40-64: CPT | Performed by: FAMILY MEDICINE

## 2025-02-17 ASSESSMENT — PATIENT HEALTH QUESTIONNAIRE - PHQ9
1. LITTLE INTEREST OR PLEASURE IN DOING THINGS: NOT AT ALL
2. FEELING DOWN, DEPRESSED OR HOPELESS: NOT AT ALL
SUM OF ALL RESPONSES TO PHQ9 QUESTIONS 1 AND 2: 0

## 2025-02-17 NOTE — PROGRESS NOTES
Subjective   Dio Martinez is a 65 y.o. male who presents for Annual Exam (Physical).    HPI  penicillin allergy   from [place]   reports he is feeling well      - labs done on 2/9/24- We might need to add another pill, such a farxiga or Jardiance.  Other than elevated sugar the electrolytes look good, lvier and kidney function is good. Platelet are slightly low, which was also low last year. Normal red and white blood cell counts, no evidence of anemia or infection. Vitamin D is good at 91, you can reduce dose by half. Vitamin B 12 is good. Normal prostate level. Cholesterol looks ok on the atorvastatin 80 mg daily. Normal thyroid levels.     #) Pos cologuard 5/7/19- 2 colonscopy since then   - Dr Mayers     #) HTN   - was recently started on meds: amlodipine - benazepril 10-40, bisoprolol-HCTZ 10-6.25 , clonidine 0.2 BID, spironolactone 50 mg   - BP is 168/93 --> 134/80 --> 132/84--> 124/78 --> 117/74 --> 124/72  - saw Dr cardenas in the past, not currently   - EKG 2/1/22 looked good   - ordered renal US and labs done- normal   - runs in the family   - no headaches , chest pain , dyspnea, change in vision  - FIB-4- 1.92      #) T2DM   - up 11#   - Dx a couple of years ago   - on metformin 1000 mg twice per day , added rybelsus 14 mg daily   - no checking sugars much at home   - home readings are around 109, random checks at home   - no low sugars  - A1c was done on 11/9/2021 at 7.7 --> 7.2% on 8/8/22 --> 7.1% on 2/13/23 --> 7.7% on 2/9/24 --> 6.7%  8/19/24 --> 7.1% 2/17/25   - down 13# in the last 6 months  - on ACE and Statin   micro albumin 7/26/21  Lipid panel looked good on 11/9/21     #) Thyroid nodule- new order for US follow up     #) ASHA - follows with Franky Perez   - CPAP use compliant      ROS was completed and all systems are negative with the exception of what was noted in the the HPI.     Objective     /72   Pulse 88   Wt 137 kg (301 lb)   SpO2 98%   BMI 35.69 kg/m²      Physical  Exam  GEN: A+O, no acute distress  HEENT: NC/AT, Oropharynx clear, no exudates, TM visualized, Extraoccular muscles intact, no facial droop; no thyromegaly or cervical LAD  RESP: CTAB, no wheezes   CV: RRR, no murmurs  ABD: soft, non-tender, + BS  SKIN: no rashes or bruising, no peripheral edema   NEURO: CN II-XII grossly intact, moves all extremities equally, no tremor   PSYCH: normal affect, appropriate mood     Assessment/Plan   Problem List Items Addressed This Visit    None       Continue the rybelsus 14 mg daily.   A1c went up a little to 7.1%   You are back up 10# so please work on diet and exercise     BMI is 37--> 35.7, goal is to get this under 30.   please continue to work on health diet and movement.     Blood pressure looks good at 124/72, Which is great     Work on good sleep and stress managment    Please continue a weekly vitamin D supplement      Please continue an over the counter B12 supplement.     please check with your local pharmacist for the shingles vaccination     Please get a thyroid ultrasound on 5/18/24- shows a benign nodule and the previous one seen on the left side is not seen.      Continue to use the sleep medicine     CT calcium score ordered     Follow up in 4 months for A1c and weight follow up, and follow up or sooner as needed.     Brittanie Childress DO, [facility], [facility]  [address]   [address]   [place] [address]  Ph. [phone]  Fx. [phone]

## 2025-02-17 NOTE — PATIENT INSTRUCTIONS
CT calcium score ordered     Continue the rybelsus 14 mg daily.   A1c went up a little to 7.1%   You are back up 10# so please work on diet and exercise     BMI is 37--> 35.7, goal is to get this under 30.   please continue to work on health diet and movement.     Blood pressure looks good at 124/72, Which is great     Work on good sleep and stress managment    Please continue a weekly vitamin D supplement      Please continue an over the counter B12 supplement.     please check with your local pharmacist for the shingles vaccination     Please get a thyroid ultrasound on 5/18/24- shows a benign nodule and the previous one seen on the left side is not seen.      Continue to use the sleep medicine     Follow up in 4 months for A1c and weight follow up, and follow up or sooner as needed.

## 2025-03-16 DIAGNOSIS — I10 HYPERTENSION, UNSPECIFIED TYPE: ICD-10-CM

## 2025-03-17 RX ORDER — AMLODIPINE AND BENAZEPRIL HYDROCHLORIDE 10; 40 MG/1; MG/1
1 CAPSULE ORAL DAILY
Qty: 90 CAPSULE | Refills: 3 | Status: SHIPPED | OUTPATIENT
Start: 2025-03-17

## 2025-05-14 ENCOUNTER — APPOINTMENT (OUTPATIENT)
Dept: SLEEP MEDICINE | Facility: CLINIC | Age: 65
End: 2025-05-14
Payer: COMMERCIAL

## 2025-05-18 DIAGNOSIS — E78.2 COMBINED HYPERLIPIDEMIA: ICD-10-CM

## 2025-05-21 RX ORDER — ATORVASTATIN CALCIUM 80 MG/1
80 TABLET, FILM COATED ORAL DAILY
Qty: 90 TABLET | Refills: 3 | Status: SHIPPED | OUTPATIENT
Start: 2025-05-21

## 2025-06-11 ENCOUNTER — APPOINTMENT (OUTPATIENT)
Dept: SLEEP MEDICINE | Facility: CLINIC | Age: 65
End: 2025-06-11
Payer: COMMERCIAL

## 2025-06-22 LAB
25(OH)D3+25(OH)D2 SERPL-MCNC: 86 NG/ML (ref 30–100)
ALBUMIN SERPL-MCNC: 3.9 G/DL (ref 3.6–5.1)
ALBUMIN/CREAT UR: NORMAL
ALP SERPL-CCNC: 54 U/L (ref 35–144)
ALT SERPL-CCNC: 15 U/L (ref 9–46)
ANION GAP SERPL CALCULATED.4IONS-SCNC: 9 MMOL/L (CALC) (ref 7–17)
AST SERPL-CCNC: 14 U/L (ref 10–35)
BASOPHILS # BLD AUTO: 60 CELLS/UL (ref 0–200)
BASOPHILS NFR BLD AUTO: 0.8 %
BILIRUB SERPL-MCNC: 0.8 MG/DL (ref 0.2–1.2)
BUN SERPL-MCNC: 24 MG/DL (ref 7–25)
CALCIUM SERPL-MCNC: 9.1 MG/DL (ref 8.6–10.3)
CHLORIDE SERPL-SCNC: 107 MMOL/L (ref 98–110)
CHOLEST SERPL-MCNC: 115 MG/DL
CHOLEST/HDLC SERPL: 3.4 (CALC)
CO2 SERPL-SCNC: 25 MMOL/L (ref 20–32)
CREAT SERPL-MCNC: 1.16 MG/DL (ref 0.7–1.35)
CREAT UR-MCNC: NORMAL MG/DL
EGFRCR SERPLBLD CKD-EPI 2021: 71 ML/MIN/1.73M2
EOSINOPHIL # BLD AUTO: 180 CELLS/UL (ref 15–500)
EOSINOPHIL NFR BLD AUTO: 2.4 %
ERYTHROCYTE [DISTWIDTH] IN BLOOD BY AUTOMATED COUNT: 13.3 % (ref 11–15)
GLUCOSE SERPL-MCNC: 140 MG/DL (ref 65–99)
HCT VFR BLD AUTO: 51.8 % (ref 38.5–50)
HDLC SERPL-MCNC: 34 MG/DL
HGB BLD-MCNC: 16.6 G/DL (ref 13.2–17.1)
LDLC SERPL CALC-MCNC: 64 MG/DL (CALC)
LYMPHOCYTES # BLD AUTO: 1440 CELLS/UL (ref 850–3900)
LYMPHOCYTES NFR BLD AUTO: 19.2 %
MCH RBC QN AUTO: 29.7 PG (ref 27–33)
MCHC RBC AUTO-ENTMCNC: 32 G/DL (ref 32–36)
MCV RBC AUTO: 92.7 FL (ref 80–100)
MICROALBUMIN UR-MCNC: NORMAL
MONOCYTES # BLD AUTO: 450 CELLS/UL (ref 200–950)
MONOCYTES NFR BLD AUTO: 6 %
NEUTROPHILS # BLD AUTO: 5370 CELLS/UL (ref 1500–7800)
NEUTROPHILS NFR BLD AUTO: 71.6 %
NONHDLC SERPL-MCNC: 81 MG/DL (CALC)
PLATELET # BLD AUTO: 141 THOUSAND/UL (ref 140–400)
PMV BLD REES-ECKER: 11.5 FL (ref 7.5–12.5)
POTASSIUM SERPL-SCNC: 4.5 MMOL/L (ref 3.5–5.3)
PROT SERPL-MCNC: 6.5 G/DL (ref 6.1–8.1)
PSA SERPL-MCNC: 0.46 NG/ML
RBC # BLD AUTO: 5.59 MILLION/UL (ref 4.2–5.8)
SODIUM SERPL-SCNC: 141 MMOL/L (ref 135–146)
TRIGL SERPL-MCNC: 89 MG/DL
TSH SERPL-ACNC: NORMAL M[IU]/L
WBC # BLD AUTO: 7.5 THOUSAND/UL (ref 3.8–10.8)

## 2025-06-23 ENCOUNTER — APPOINTMENT (OUTPATIENT)
Dept: PRIMARY CARE | Facility: CLINIC | Age: 65
End: 2025-06-23
Payer: COMMERCIAL

## 2025-06-23 VITALS
BODY MASS INDEX: 36.17 KG/M2 | SYSTOLIC BLOOD PRESSURE: 126 MMHG | OXYGEN SATURATION: 98 % | WEIGHT: 305 LBS | DIASTOLIC BLOOD PRESSURE: 80 MMHG | HEART RATE: 76 BPM

## 2025-06-23 DIAGNOSIS — E11.9 NON-INSULIN DEPENDENT TYPE 2 DIABETES MELLITUS (MULTI): Primary | ICD-10-CM

## 2025-06-23 LAB
25(OH)D3+25(OH)D2 SERPL-MCNC: 86 NG/ML (ref 30–100)
ALBUMIN SERPL-MCNC: 3.9 G/DL (ref 3.6–5.1)
ALBUMIN/CREAT UR: 6 MG/G CREAT
ALP SERPL-CCNC: 54 U/L (ref 35–144)
ALT SERPL-CCNC: 15 U/L (ref 9–46)
ANION GAP SERPL CALCULATED.4IONS-SCNC: 9 MMOL/L (CALC) (ref 7–17)
AST SERPL-CCNC: 14 U/L (ref 10–35)
BASOPHILS # BLD AUTO: 60 CELLS/UL (ref 0–200)
BASOPHILS NFR BLD AUTO: 0.8 %
BILIRUB SERPL-MCNC: 0.8 MG/DL (ref 0.2–1.2)
BUN SERPL-MCNC: 24 MG/DL (ref 7–25)
CALCIUM SERPL-MCNC: 9.1 MG/DL (ref 8.6–10.3)
CHLORIDE SERPL-SCNC: 107 MMOL/L (ref 98–110)
CHOLEST SERPL-MCNC: 115 MG/DL
CHOLEST/HDLC SERPL: 3.4 (CALC)
CO2 SERPL-SCNC: 25 MMOL/L (ref 20–32)
CREAT SERPL-MCNC: 1.16 MG/DL (ref 0.7–1.35)
CREAT UR-MCNC: 101 MG/DL (ref 20–320)
EGFRCR SERPLBLD CKD-EPI 2021: 71 ML/MIN/1.73M2
EOSINOPHIL # BLD AUTO: 180 CELLS/UL (ref 15–500)
EOSINOPHIL NFR BLD AUTO: 2.4 %
ERYTHROCYTE [DISTWIDTH] IN BLOOD BY AUTOMATED COUNT: 13.3 % (ref 11–15)
GLUCOSE SERPL-MCNC: 140 MG/DL (ref 65–99)
HCT VFR BLD AUTO: 51.8 % (ref 38.5–50)
HDLC SERPL-MCNC: 34 MG/DL
HGB BLD-MCNC: 16.6 G/DL (ref 13.2–17.1)
LDLC SERPL CALC-MCNC: 64 MG/DL (CALC)
LYMPHOCYTES # BLD AUTO: 1440 CELLS/UL (ref 850–3900)
LYMPHOCYTES NFR BLD AUTO: 19.2 %
MCH RBC QN AUTO: 29.7 PG (ref 27–33)
MCHC RBC AUTO-ENTMCNC: 32 G/DL (ref 32–36)
MCV RBC AUTO: 92.7 FL (ref 80–100)
MICROALBUMIN UR-MCNC: 0.6 MG/DL
MONOCYTES # BLD AUTO: 450 CELLS/UL (ref 200–950)
MONOCYTES NFR BLD AUTO: 6 %
NEUTROPHILS # BLD AUTO: 5370 CELLS/UL (ref 1500–7800)
NEUTROPHILS NFR BLD AUTO: 71.6 %
NONHDLC SERPL-MCNC: 81 MG/DL (CALC)
PLATELET # BLD AUTO: 141 THOUSAND/UL (ref 140–400)
PMV BLD REES-ECKER: 11.5 FL (ref 7.5–12.5)
POC HEMOGLOBIN A1C: 6.7 % (ref 4.2–6.5)
POTASSIUM SERPL-SCNC: 4.5 MMOL/L (ref 3.5–5.3)
PROT SERPL-MCNC: 6.5 G/DL (ref 6.1–8.1)
PSA SERPL-MCNC: 0.46 NG/ML
RBC # BLD AUTO: 5.59 MILLION/UL (ref 4.2–5.8)
SODIUM SERPL-SCNC: 141 MMOL/L (ref 135–146)
TRIGL SERPL-MCNC: 89 MG/DL
TSH SERPL-ACNC: 2.73 MIU/L (ref 0.4–4.5)
WBC # BLD AUTO: 7.5 THOUSAND/UL (ref 3.8–10.8)

## 2025-06-23 PROCEDURE — 3079F DIAST BP 80-89 MM HG: CPT | Performed by: FAMILY MEDICINE

## 2025-06-23 PROCEDURE — 3044F HG A1C LEVEL LT 7.0%: CPT | Performed by: FAMILY MEDICINE

## 2025-06-23 PROCEDURE — 99213 OFFICE O/P EST LOW 20 MIN: CPT | Performed by: FAMILY MEDICINE

## 2025-06-23 PROCEDURE — 1036F TOBACCO NON-USER: CPT | Performed by: FAMILY MEDICINE

## 2025-06-23 PROCEDURE — 3074F SYST BP LT 130 MM HG: CPT | Performed by: FAMILY MEDICINE

## 2025-06-23 PROCEDURE — 83036 HEMOGLOBIN GLYCOSYLATED A1C: CPT | Performed by: FAMILY MEDICINE

## 2025-06-23 ASSESSMENT — PATIENT HEALTH QUESTIONNAIRE - PHQ9
SUM OF ALL RESPONSES TO PHQ9 QUESTIONS 1 AND 2: 0
1. LITTLE INTEREST OR PLEASURE IN DOING THINGS: NOT AT ALL
2. FEELING DOWN, DEPRESSED OR HOPELESS: NOT AT ALL

## 2025-06-23 NOTE — PROGRESS NOTES
Subjective   Dio Martinez is a 65 y.o. male who presents for Follow-up (4 month follow up).    HPI  penicillin allergy   from [place]   reports he is feeling well      - labs done on 2/9/24- We might need to add another pill, such a farxiga or Jardiance.  Other than elevated sugar the electrolytes look good, lvier and kidney function is good. Platelet are slightly low, which was also low last year. Normal red and white blood cell counts, no evidence of anemia or infection. Vitamin D is good at 91, you can reduce dose by half. Vitamin B 12 is good. Normal prostate level. Cholesterol looks ok on the atorvastatin 80 mg daily. Normal thyroid levels.     #) Pos cologuard 5/7/19- 2 colonscopy since then   - Dr Mayers - done at [facility]    #) HTN   - was recently started on meds: amlodipine - benazepril 10-40, bisoprolol-HCTZ 10-6.25 , clonidine 0.2 BID, spironolactone 50 mg   - BP is great today at 126/80   - saw Dr cardenas in the past, not currently   - EKG 2/1/22 looked good   - ordered renal US and labs done- normal   - runs in the family   - no headaches , chest pain , dyspnea, change in vision  - FIB-4- 1.92      #) T2DM   - Dx a couple of years ago   - on metformin 1000 mg twice per day , added rybelsus 14 mg daily   - no checking sugars much at home   - home readings are around 109, random checks at home   - no low sugars  - A1c was done on 11/9/2021 at 7.7 --> 7.2% on 8/8/22 --> 7.1% on 2/13/23 --> 7.7% on 2/9/24 --> 6.7%  8/19/24 --> 7.1% 2/17/25 --> 6.7% today   - on ACE and Statin   micro albumin 7/26/21  Lipid panel looked good on 11/9/21     #) Thyroid nodule- new order for US follow up 5/18/24 was good.     #) ASHA - follows with Franky Perez   - CPAP use compliant      ROS was completed and all systems are negative with the exception of what was noted in the the HPI.     Objective     /80   Pulse 76   Wt 138 kg (305 lb)   SpO2 98%   BMI 36.17 kg/m²      Physical Exam  GEN: A+O, no acute  distress  HEENT: NC/AT, Oropharynx clear, no exudates, TM visualized, Extraoccular muscles intact, no facial droop; no thyromegaly or cervical LAD  RESP: CTAB, no wheezes   CV: RRR, no murmurs  ABD: soft, non-tender, + BS  SKIN: no rashes or bruising, no peripheral edema   NEURO: CN II-XII grossly intact, moves all extremities equally, no tremor   PSYCH: normal affect, appropriate mood     Assessment/Plan   Problem List Items Addressed This Visit    None       Continue the rybelsus 14 mg daily.   A1c went up a little to 7.1%   You are back up 10# so please work on diet and exercise     BMI is 37--> 35.7, goal is to get this under 30.   please continue to work on health diet and movement.     Blood pressure looks good at 124/72, Which is great     Work on good sleep and stress managment    Please continue a weekly vitamin D supplement      Please continue an over the counter B12 supplement.     please check with your local pharmacist for the shingles vaccination     Please get a thyroid ultrasound on 5/18/24- shows a benign nodule and the previous one seen on the left side is not seen.      Continue to use the sleep medicine     CT calcium score ordered     Follow up in 4 months for A1c and weight follow up, and follow up or sooner as needed.     Brittanie Childress, DO, [facility], [facility]  [address]   [address]   [place] [address]  Ph. [phone]  Fx. [phone]

## 2025-06-23 NOTE — PATIENT INSTRUCTIONS
CT calcium score ordered     Continue the rybelsus 14 mg daily.   A1c was better today at 6.7% , goal is less than 7%     BMI is 37--> 35.7, goal is to get this under 30.   please continue to work on health diet and movement.     Blood pressure looks good at 124/72, Which is great     Work on good sleep and stress managment    Please continue a weekly vitamin D supplement      Please continue an over the counter B12 supplement.     please check with your local pharmacist for the shingles vaccination     Please get a thyroid ultrasound on 5/18/24- shows a benign nodule and the previous one seen on the left side is not seen.      Continue to use the sleep medicine     Follow up in 4 months for A1c and weight follow up, and follow up or sooner as needed.

## 2025-07-30 ENCOUNTER — APPOINTMENT (OUTPATIENT)
Dept: SLEEP MEDICINE | Facility: CLINIC | Age: 65
End: 2025-07-30
Payer: COMMERCIAL

## 2025-08-16 ENCOUNTER — HOSPITAL ENCOUNTER (EMERGENCY)
Facility: HOSPITAL | Age: 65
Discharge: HOME | End: 2025-08-17
Attending: EMERGENCY MEDICINE
Payer: COMMERCIAL

## 2025-08-16 ENCOUNTER — APPOINTMENT (OUTPATIENT)
Dept: CARDIOLOGY | Facility: HOSPITAL | Age: 65
End: 2025-08-16
Payer: COMMERCIAL

## 2025-08-16 DIAGNOSIS — T78.2XXA ANAPHYLAXIS, INITIAL ENCOUNTER: Primary | ICD-10-CM

## 2025-08-16 DIAGNOSIS — I48.91 ATRIAL FIBRILLATION, UNSPECIFIED TYPE (MULTI): ICD-10-CM

## 2025-08-16 LAB
ALBUMIN SERPL BCP-MCNC: 4.1 G/DL (ref 3.4–5)
ALP SERPL-CCNC: 50 U/L (ref 33–136)
ALT SERPL W P-5'-P-CCNC: 14 U/L (ref 10–52)
ANION GAP SERPL CALC-SCNC: 14 MMOL/L (ref 10–20)
AST SERPL W P-5'-P-CCNC: 14 U/L (ref 9–39)
BASOPHILS # BLD AUTO: 0.02 X10*3/UL (ref 0–0.1)
BASOPHILS NFR BLD AUTO: 0.2 %
BILIRUB SERPL-MCNC: 0.9 MG/DL (ref 0–1.2)
BUN SERPL-MCNC: 26 MG/DL (ref 6–23)
CALCIUM SERPL-MCNC: 9.3 MG/DL (ref 8.6–10.3)
CARDIAC TROPONIN I PNL SERPL HS: 3 NG/L (ref 0–20)
CHLORIDE SERPL-SCNC: 108 MMOL/L (ref 98–107)
CO2 SERPL-SCNC: 21 MMOL/L (ref 21–32)
CREAT SERPL-MCNC: 1.36 MG/DL (ref 0.5–1.3)
EGFRCR SERPLBLD CKD-EPI 2021: 58 ML/MIN/1.73M*2
EOSINOPHIL # BLD AUTO: 0.14 X10*3/UL (ref 0–0.7)
EOSINOPHIL NFR BLD AUTO: 1.4 %
ERYTHROCYTE [DISTWIDTH] IN BLOOD BY AUTOMATED COUNT: 13.3 % (ref 11.5–14.5)
GLUCOSE BLD MANUAL STRIP-MCNC: 205 MG/DL (ref 74–99)
GLUCOSE SERPL-MCNC: 215 MG/DL (ref 74–99)
HCT VFR BLD AUTO: 51.7 % (ref 41–52)
HGB BLD-MCNC: 16.9 G/DL (ref 13.5–17.5)
HOLD SPECIMEN: NORMAL
IMM GRANULOCYTES # BLD AUTO: 0.06 X10*3/UL (ref 0–0.7)
IMM GRANULOCYTES NFR BLD AUTO: 0.6 % (ref 0–0.9)
LYMPHOCYTES # BLD AUTO: 1.94 X10*3/UL (ref 1.2–4.8)
LYMPHOCYTES NFR BLD AUTO: 19.7 %
MCH RBC QN AUTO: 29.7 PG (ref 26–34)
MCHC RBC AUTO-ENTMCNC: 32.7 G/DL (ref 32–36)
MCV RBC AUTO: 91 FL (ref 80–100)
MONOCYTES # BLD AUTO: 0.54 X10*3/UL (ref 0.1–1)
MONOCYTES NFR BLD AUTO: 5.5 %
NEUTROPHILS # BLD AUTO: 7.13 X10*3/UL (ref 1.2–7.7)
NEUTROPHILS NFR BLD AUTO: 72.6 %
NRBC BLD-RTO: 0 /100 WBCS (ref 0–0)
PLATELET # BLD AUTO: 159 X10*3/UL (ref 150–450)
POTASSIUM SERPL-SCNC: 3.6 MMOL/L (ref 3.5–5.3)
PROT SERPL-MCNC: 6.7 G/DL (ref 6.4–8.2)
RBC # BLD AUTO: 5.69 X10*6/UL (ref 4.5–5.9)
SODIUM SERPL-SCNC: 139 MMOL/L (ref 136–145)
T4 FREE SERPL-MCNC: 0.89 NG/DL (ref 0.61–1.12)
TSH SERPL-ACNC: 5.93 MIU/L (ref 0.44–3.98)
WBC # BLD AUTO: 9.8 X10*3/UL (ref 4.4–11.3)

## 2025-08-16 PROCEDURE — 85025 COMPLETE CBC W/AUTO DIFF WBC: CPT | Performed by: EMERGENCY MEDICINE

## 2025-08-16 PROCEDURE — 84439 ASSAY OF FREE THYROXINE: CPT | Performed by: EMERGENCY MEDICINE

## 2025-08-16 PROCEDURE — 82947 ASSAY GLUCOSE BLOOD QUANT: CPT | Mod: 59

## 2025-08-16 PROCEDURE — 94760 N-INVAS EAR/PLS OXIMETRY 1: CPT

## 2025-08-16 PROCEDURE — 96374 THER/PROPH/DIAG INJ IV PUSH: CPT

## 2025-08-16 PROCEDURE — 80053 COMPREHEN METABOLIC PANEL: CPT | Performed by: EMERGENCY MEDICINE

## 2025-08-16 PROCEDURE — 96375 TX/PRO/DX INJ NEW DRUG ADDON: CPT

## 2025-08-16 PROCEDURE — 84484 ASSAY OF TROPONIN QUANT: CPT | Performed by: EMERGENCY MEDICINE

## 2025-08-16 PROCEDURE — 93005 ELECTROCARDIOGRAM TRACING: CPT

## 2025-08-16 PROCEDURE — 36415 COLL VENOUS BLD VENIPUNCTURE: CPT | Performed by: EMERGENCY MEDICINE

## 2025-08-16 PROCEDURE — 84443 ASSAY THYROID STIM HORMONE: CPT | Performed by: EMERGENCY MEDICINE

## 2025-08-16 PROCEDURE — 2500000004 HC RX 250 GENERAL PHARMACY W/ HCPCS (ALT 636 FOR OP/ED): Mod: JZ | Performed by: EMERGENCY MEDICINE

## 2025-08-16 PROCEDURE — 96361 HYDRATE IV INFUSION ADD-ON: CPT

## 2025-08-16 PROCEDURE — 99291 CRITICAL CARE FIRST HOUR: CPT | Mod: 25 | Performed by: EMERGENCY MEDICINE

## 2025-08-16 PROCEDURE — 2500000001 HC RX 250 WO HCPCS SELF ADMINISTERED DRUGS (ALT 637 FOR MEDICARE OP): Performed by: EMERGENCY MEDICINE

## 2025-08-16 RX ORDER — DIPHENHYDRAMINE HYDROCHLORIDE 50 MG/ML
50 INJECTION, SOLUTION INTRAMUSCULAR; INTRAVENOUS ONCE
Status: COMPLETED | OUTPATIENT
Start: 2025-08-16 | End: 2025-08-16

## 2025-08-16 RX ORDER — EPINEPHRINE 0.3 MG/.3ML
0.3 INJECTION SUBCUTANEOUS ONCE
Status: COMPLETED | OUTPATIENT
Start: 2025-08-16 | End: 2025-08-16

## 2025-08-16 RX ORDER — PREDNISONE 50 MG/1
50 TABLET ORAL DAILY
Qty: 5 TABLET | Refills: 0 | Status: SHIPPED | OUTPATIENT
Start: 2025-08-16 | End: 2025-08-22

## 2025-08-16 RX ORDER — EPINEPHRINE 0.3 MG/.3ML
1 INJECTION SUBCUTANEOUS ONCE AS NEEDED
Qty: 1 EACH | Refills: 0 | Status: SHIPPED | OUTPATIENT
Start: 2025-08-16

## 2025-08-16 RX ORDER — FAMOTIDINE 10 MG/ML
40 INJECTION, SOLUTION INTRAVENOUS ONCE
Status: COMPLETED | OUTPATIENT
Start: 2025-08-16 | End: 2025-08-16

## 2025-08-16 RX ORDER — DIPHENHYDRAMINE HCL 50 MG
50 CAPSULE ORAL EVERY 6 HOURS PRN
Qty: 30 CAPSULE | Refills: 0 | Status: SHIPPED | OUTPATIENT
Start: 2025-08-16 | End: 2025-08-26

## 2025-08-16 RX ADMIN — SODIUM CHLORIDE 1000 ML: 9 INJECTION, SOLUTION INTRAVENOUS at 20:45

## 2025-08-16 RX ADMIN — METHYLPREDNISOLONE SODIUM SUCCINATE 125 MG: 125 INJECTION, POWDER, FOR SOLUTION INTRAMUSCULAR; INTRAVENOUS at 20:44

## 2025-08-16 RX ADMIN — FAMOTIDINE 40 MG: 10 INJECTION, SOLUTION INTRAVENOUS at 20:44

## 2025-08-16 RX ADMIN — APIXABAN 5 MG: 5 TABLET, FILM COATED ORAL at 22:39

## 2025-08-16 RX ADMIN — DIPHENHYDRAMINE HYDROCHLORIDE 50 MG: 50 INJECTION, SOLUTION INTRAMUSCULAR; INTRAVENOUS at 20:43

## 2025-08-16 RX ADMIN — EPINEPHRINE 0.3 MG: 0.3 INJECTION INTRAMUSCULAR at 20:45

## 2025-08-16 ASSESSMENT — PAIN DESCRIPTION - PAIN TYPE: TYPE: ACUTE PAIN

## 2025-08-16 ASSESSMENT — PAIN - FUNCTIONAL ASSESSMENT: PAIN_FUNCTIONAL_ASSESSMENT: 0-10

## 2025-08-16 ASSESSMENT — PAIN SCALES - GENERAL
PAINLEVEL_OUTOF10: 0 - NO PAIN
PAINLEVEL_OUTOF10: 5 - MODERATE PAIN

## 2025-08-16 ASSESSMENT — PAIN DESCRIPTION - DESCRIPTORS: DESCRIPTORS: BURNING

## 2025-08-16 ASSESSMENT — PAIN DESCRIPTION - LOCATION: LOCATION: MOUTH

## 2025-08-17 VITALS
TEMPERATURE: 97.6 F | RESPIRATION RATE: 18 BRPM | BODY MASS INDEX: 36.7 KG/M2 | OXYGEN SATURATION: 93 % | DIASTOLIC BLOOD PRESSURE: 93 MMHG | WEIGHT: 310.85 LBS | HEIGHT: 77 IN | HEART RATE: 99 BPM | SYSTOLIC BLOOD PRESSURE: 148 MMHG

## 2025-08-17 ASSESSMENT — PAIN SCALES - GENERAL: PAINLEVEL_OUTOF10: 0 - NO PAIN

## 2025-08-17 ASSESSMENT — PAIN - FUNCTIONAL ASSESSMENT: PAIN_FUNCTIONAL_ASSESSMENT: 0-10

## 2025-08-18 ENCOUNTER — TELEPHONE (OUTPATIENT)
Dept: PRIMARY CARE | Facility: CLINIC | Age: 65
End: 2025-08-18
Payer: COMMERCIAL

## 2025-08-18 DIAGNOSIS — I48.91 ATRIAL FIBRILLATION, UNSPECIFIED TYPE (MULTI): Primary | ICD-10-CM

## 2025-08-18 LAB
HOLD SPECIMEN: NORMAL

## 2025-08-23 LAB
ATRIAL RATE: 241 BPM
Q ONSET: 226 MS
QRS COUNT: 15 BEATS
QRS DURATION: 92 MS
QT INTERVAL: 388 MS
QTC CALCULATION(BAZETT): 469 MS
QTC FREDERICIA: 441 MS
R AXIS: 17 DEGREES
T AXIS: -23 DEGREES
T OFFSET: 420 MS
VENTRICULAR RATE: 88 BPM

## 2025-08-29 ENCOUNTER — APPOINTMENT (OUTPATIENT)
Dept: SLEEP MEDICINE | Facility: CLINIC | Age: 65
End: 2025-08-29
Payer: COMMERCIAL

## 2025-08-29 VITALS
DIASTOLIC BLOOD PRESSURE: 72 MMHG | HEIGHT: 77 IN | WEIGHT: 305 LBS | OXYGEN SATURATION: 97 % | HEART RATE: 74 BPM | SYSTOLIC BLOOD PRESSURE: 122 MMHG | BODY MASS INDEX: 36.01 KG/M2

## 2025-08-29 DIAGNOSIS — G47.33 OSA (OBSTRUCTIVE SLEEP APNEA): Primary | ICD-10-CM

## 2025-08-29 DIAGNOSIS — I10 ESSENTIAL HYPERTENSION: ICD-10-CM

## 2025-08-29 PROCEDURE — 3074F SYST BP LT 130 MM HG: CPT | Performed by: PHYSICIAN ASSISTANT

## 2025-08-29 PROCEDURE — 3008F BODY MASS INDEX DOCD: CPT | Performed by: PHYSICIAN ASSISTANT

## 2025-08-29 PROCEDURE — 3078F DIAST BP <80 MM HG: CPT | Performed by: PHYSICIAN ASSISTANT

## 2025-08-29 PROCEDURE — 1036F TOBACCO NON-USER: CPT | Performed by: PHYSICIAN ASSISTANT

## 2025-08-29 PROCEDURE — 3044F HG A1C LEVEL LT 7.0%: CPT | Performed by: PHYSICIAN ASSISTANT

## 2025-08-29 PROCEDURE — 99213 OFFICE O/P EST LOW 20 MIN: CPT | Performed by: PHYSICIAN ASSISTANT

## 2025-08-29 ASSESSMENT — LIFESTYLE VARIABLES
AUDIT-C TOTAL SCORE: 3
SKIP TO QUESTIONS 9-10: 1
HOW OFTEN DO YOU HAVE SIX OR MORE DRINKS ON ONE OCCASION: NEVER
HOW MANY STANDARD DRINKS CONTAINING ALCOHOL DO YOU HAVE ON A TYPICAL DAY: 1 OR 2
HOW OFTEN DO YOU HAVE A DRINK CONTAINING ALCOHOL: 2-3 TIMES A WEEK

## 2025-08-29 ASSESSMENT — SLEEP AND FATIGUE QUESTIONNAIRES
HOW LIKELY ARE YOU TO NOD OFF OR FALL ASLEEP WHILE SITTING AND READING: SLIGHT CHANCE OF DOZING
HOW LIKELY ARE YOU TO NOD OFF OR FALL ASLEEP WHEN YOU ARE A PASSENGER IN A CAR FOR AN HOUR WITHOUT A BREAK: SLIGHT CHANCE OF DOZING
HOW LIKELY ARE YOU TO NOD OFF OR FALL ASLEEP WHILE SITTING AND TALKING TO SOMEONE: SLIGHT CHANCE OF DOZING
SITING INACTIVE IN A PUBLIC PLACE LIKE A CLASS ROOM OR A MOVIE THEATER: SLIGHT CHANCE OF DOZING
HOW LIKELY ARE YOU TO NOD OFF OR FALL ASLEEP WHILE LYING DOWN TO REST IN THE AFTERNOON WHEN CIRCUMSTANCES PERMIT: MODERATE CHANCE OF DOZING
ESS-CHAD TOTAL SCORE: 8
HOW LIKELY ARE YOU TO NOD OFF OR FALL ASLEEP WHILE WATCHING TV: SLIGHT CHANCE OF DOZING
HOW LIKELY ARE YOU TO NOD OFF OR FALL ASLEEP IN A CAR, WHILE STOPPED FOR A FEW MINUTES IN TRAFFIC: WOULD NEVER DOZE
HOW LIKELY ARE YOU TO NOD OFF OR FALL ASLEEP WHILE SITTING QUIETLY AFTER LUNCH WITHOUT ALCOHOL: SLIGHT CHANCE OF DOZING

## 2025-08-29 ASSESSMENT — PAIN SCALES - GENERAL: PAINLEVEL_OUTOF10: 0-NO PAIN

## 2025-09-05 ENCOUNTER — HOSPITAL ENCOUNTER (OUTPATIENT)
Dept: CARDIOLOGY | Facility: HOSPITAL | Age: 65
Discharge: HOME | End: 2025-09-05
Payer: COMMERCIAL

## 2025-09-05 DIAGNOSIS — I48.91 ATRIAL FIBRILLATION, UNSPECIFIED TYPE (MULTI): ICD-10-CM

## 2025-09-05 PROCEDURE — 93246 EXT ECG>7D<15D RECORDING: CPT

## 2026-02-02 ENCOUNTER — APPOINTMENT (OUTPATIENT)
Dept: PRIMARY CARE | Facility: CLINIC | Age: 66
End: 2026-02-02
Payer: COMMERCIAL

## 2026-08-28 ENCOUNTER — APPOINTMENT (OUTPATIENT)
Dept: SLEEP MEDICINE | Facility: CLINIC | Age: 66
End: 2026-08-28
Payer: COMMERCIAL